# Patient Record
Sex: FEMALE | Race: AMERICAN INDIAN OR ALASKA NATIVE
[De-identification: names, ages, dates, MRNs, and addresses within clinical notes are randomized per-mention and may not be internally consistent; named-entity substitution may affect disease eponyms.]

---

## 2018-07-27 ENCOUNTER — HOSPITAL ENCOUNTER (OUTPATIENT)
Dept: HOSPITAL 5 - SPVWC | Age: 53
Discharge: HOME | End: 2018-07-27
Attending: FAMILY MEDICINE
Payer: COMMERCIAL

## 2018-07-27 DIAGNOSIS — Z12.31: Primary | ICD-10-CM

## 2018-07-27 PROCEDURE — 77067 SCR MAMMO BI INCL CAD: CPT

## 2018-07-27 NOTE — MAMMOGRAPHY REPORT
BILATERAL DIGITAL SCREENING MAMMOGRAM with CAD : 07/27/18 08:25:00



CLINICAL: Routine screening.



COMPARISON:02/14/17



FINDINGS: The breasts are heterogeneously dense, which may obscure 

small masses. No mass, architectural distortion or suspicious 

calcifications.



IMPRESSION: No mammographic evidence of malignancy.



BI-RADS CATEGORY:  2 -- Benign



RECOMMENDATION: Routine mammographic screening in one year.

## 2019-08-05 NOTE — MAMMOGRAPHY REPORT
DIGITAL SCREENING MAMMOGRAM WITH CAD, 8/2/2019



INDICATION: Routine screening mammography. 



TECHNIQUE:  Digital bilateral  2D mammography was obtained in the craniocaudal and mediolateral obliq
ue projections. This examination was interpreted with the benefit of Computer-Aided Detection analysi
s.



COMPARISON: 7/27/2018, 2/14/2017



FINDINGS: 



Breast Density: There are scattered areas of fibroglandular density.



There is no evidence of dominant mass, suspicious calcifications or architectural distortion in eithe
r breast. Stable benign nodular density is present in the lateral left breast.

Overall, no interval change in the appearance of the mammogram.



IMPRESSION: No evidence of malignancy.



BI-RADS Category 2:  Benign.  No mammographic evidence of malignancy.  Recommend routine screening ma
mmography in one year.



A "normal" or negative report should not discourage follow up or biopsy of a clinically significant f
inding.



A written summary of these findings will be mailed to the patient. The patient will be entered into a
 mammography reporting system which will generate a reminder letter for the patient's next appointmen
t at the appropriate interval.



The [employer] recommends yearly mammograms starting at age 40 and continuing as l
catarina as a woman is in good health.  Breast MRI is recommended for women with an approximate 20-25% or 
greater lifetime risk of breast cancer, including women with a strong family history of breast or ova
maral cancer or who have been treated for Hodgkin's disease.



Signer Name: Kirsten Anderson MD 

Signed: 8/5/2019 10:56 AM

 Workstation Name: [id number]

## 2020-08-05 NOTE — MAMMOGRAPHY REPORT
DIGITAL SCREENING MAMMOGRAM WITH CAD, 8/5/2020



INDICATION: Routine screening mammography. 



TECHNIQUE:  Digital bilateral  2D mammography was obtained in the craniocaudal and mediolateral obliq
ue projections. This examination was interpreted with the benefit of Computer-Aided Detection analysi
s.



COMPARISON: 8/2/2019, 7/27/2018



FINDINGS: 



Breast Density: There are scattered areas of fibroglandular density.



There is no evidence of dominant mass, suspicious calcifications or architectural distortion in eithe
r breast. The previously described nodular density located laterally in the left breast is unchanged.




IMPRESSION:



Follow up recommendation: Routine yearly



BI-RADS Category 2:  Benign.



A "normal" or negative report should not discourage follow up or biopsy of a clinically significant f
inding.



A written summary of these findings will be mailed to the patient. The patient will be entered into a
 mammography reporting system which will generate a reminder letter for the patient's next appointmen
t at the appropriate interval.



The [employer] recommends yearly mammograms starting at age 40 and continuing as l
catarina as a woman is in good health.  Breast MRI is recommended for women with an approximate 20-25% or 
greater lifetime risk of breast cancer, including women with a strong family history of breast or ova
maral cancer or who have been treated for Hodgkin's disease.



Signer Name: Lowell Cortes MD 

Signed: 8/5/2020 9:48 AM

Workstation Name: [id number]

## 2021-08-06 NOTE — MAMMOGRAPHY REPORT
DIGITAL SCREENING MAMMOGRAM WITH CAD, 8/6/2021



CLINICAL INFORMATION / INDICATION: Routine screening mammography. SCREENING MAMMO Z12.31



TECHNIQUE:  Digital bilateral 2D mammography was obtained in the craniocaudal and mediolateral obliqu
e projections. This examination was interpreted with the benefit of Computer-Aided Detection analysis
.



COMPARISON: 8/5/2020, 8/2/2019



FINDINGS: 



Breast Density: There are scattered areas of fibroglandular density.



No dominant mass, suspicious calcifications, or architectural distortion in either breast. 



Nodular density in the left breast is unchanged.

 

IMPRESSION: No mammographic evidence of malignancy.



Follow up recommendation: Routine yearly



BI-RADS Category 2:  Benign.





-------------------------------------------------------------------------------------------

A "normal" or negative report should not discourage follow up or biopsy of a clinically significant f
inding.



A written summary of these findings will be mailed to the patient. The patient will be entered into a
 mammography reporting system which will generate a reminder letter for the patient's next appointmen
t at the appropriate interval.



The [employer] recommends yearly mammograms starting at age 40 and continuing as l
catarina as a woman is in good health.  Breast MRI is recommended for women with an approximate 20-25% or 
greater lifetime risk of breast cancer, including women with a strong family history of breast or ova
maral cancer or who have been treated for Hodgkin's disease.



Signer Name: Kelvin Navarrete MD 

Signed: 8/6/2021 10:28 AM

Workstation Name: [id number]

## 2022-08-10 NOTE — MAMMOGRAPHY REPORT
DIGITAL SCREENING MAMMOGRAM WITH CAD, 8/8/2022



CLINICAL INFORMATION / INDICATION: Routine screening mammography. Z12.31



TECHNIQUE:  Digital bilateral 2D mammography was obtained in the craniocaudal and mediolateral obliqu
e projections. This examination was interpreted with the benefit of Computer-Aided Detection analysis
.



COMPARISON: 8/6/2021 and 8/5/2020



FINDINGS: 



Breast Density: There are scattered areas of fibroglandular density.



No dominant mass, suspicious calcifications, or architectural distortion in either breast. 





 

IMPRESSION: No mammographic evidence of malignancy.



Follow up recommendation: Routine yearly screening mammogram.



BI-RADS Category 1:  NEGATIVE





-------------------------------------------------------------------------------------------

A "normal" or negative report should not discourage follow up or biopsy of a clinically significant f
inding.



A written summary of these findings will be mailed to the patient. The patient will be entered into a
 mammography reporting system which will generate a reminder letter for the patient's next appointmen
t at the appropriate interval.



The [employer] recommends yearly mammograms starting at age 40 and continuing as l
catarina as a woman is in good health.  Breast MRI is recommended for women with an approximate 20-25% or 
greater lifetime risk of breast cancer, including women with a strong family history of breast or ova
maral cancer or who have been treated for Hodgkin's disease.



Signer Name: Avi Martinez MD 

Signed: 8/10/2022 9:26 AM

Workstation Name: [id number]